# Patient Record
Sex: FEMALE | Race: BLACK OR AFRICAN AMERICAN | Employment: UNEMPLOYED | URBAN - METROPOLITAN AREA
[De-identification: names, ages, dates, MRNs, and addresses within clinical notes are randomized per-mention and may not be internally consistent; named-entity substitution may affect disease eponyms.]

---

## 2017-01-01 ENCOUNTER — HOSPITAL ENCOUNTER (OUTPATIENT)
Age: 47
Discharge: HOME | End: 2017-01-01
Attending: FAMILY MEDICINE

## 2017-01-01 VITALS
WEIGHT: 150 LBS | HEART RATE: 92 BPM | RESPIRATION RATE: 16 BRPM | TEMPERATURE: 98 F | BODY MASS INDEX: 23 KG/M2 | OXYGEN SATURATION: 100 % | DIASTOLIC BLOOD PRESSURE: 91 MMHG | SYSTOLIC BLOOD PRESSURE: 140 MMHG

## 2017-01-01 DIAGNOSIS — J40 BRONCHITIS: Primary | ICD-10-CM

## 2017-01-01 DIAGNOSIS — R06.2 WHEEZING: ICD-10-CM

## 2017-01-01 PROCEDURE — 94640 AIRWAY INHALATION TREATMENT: CPT

## 2017-01-01 PROCEDURE — 99203 OFFICE O/P NEW LOW 30 MIN: CPT

## 2017-01-01 PROCEDURE — 99204 OFFICE O/P NEW MOD 45 MIN: CPT

## 2017-01-01 RX ORDER — PREDNISONE 20 MG/1
40 TABLET ORAL DAILY
Qty: 10 TABLET | Refills: 0 | Status: SHIPPED | OUTPATIENT
Start: 2017-01-01 | End: 2017-01-06

## 2017-01-01 RX ORDER — AZITHROMYCIN 250 MG/1
TABLET, FILM COATED ORAL
Qty: 1 PACKAGE | Refills: 0 | Status: SHIPPED | OUTPATIENT
Start: 2017-01-01 | End: 2017-01-04

## 2017-01-01 RX ORDER — ALBUTEROL SULFATE 90 UG/1
2 AEROSOL, METERED RESPIRATORY (INHALATION) EVERY 4 HOURS PRN
Qty: 1 INHALER | Refills: 0 | Status: SHIPPED | OUTPATIENT
Start: 2017-01-01 | End: 2017-01-31

## 2017-01-01 RX ORDER — PREDNISONE 20 MG/1
60 TABLET ORAL ONCE
Status: COMPLETED | OUTPATIENT
Start: 2017-01-01 | End: 2017-01-01

## 2017-01-01 RX ORDER — IPRATROPIUM BROMIDE AND ALBUTEROL SULFATE 2.5; .5 MG/3ML; MG/3ML
3 SOLUTION RESPIRATORY (INHALATION) ONCE
Status: COMPLETED | OUTPATIENT
Start: 2017-01-01 | End: 2017-01-01

## 2017-01-01 NOTE — ED PROVIDER NOTES
Patient Seen in: [address]    History   Patient presents with:  Cough/URI    Stated Complaint: CONGESTION    Patient is a 55year old female presenting with cough. Cough  This is a new problem.  The current episode sta • Heart Disorder Maternal Grandmother    • Heart Disorder Maternal Grandfather    • Diabetes Paternal Grandmother    • Cancer Paternal Grandmother      CERVICAL   • Diabetes Paternal Grandfather    • Cancer Other      colon   • Breast Cancer Other      b time.   Skin: Skin is warm and dry. Psychiatric: She has a normal mood and affect. Her behavior is normal.   Nursing note and vitals reviewed.             ED Course   Labs Reviewed - No data to display      Disposition and Plan     Clinical Impression:  B

## 2017-01-01 NOTE — ED INITIAL ASSESSMENT (HPI)
Symptoms started Wed., cough and congestion, pt. Feels wheezing inetrmittently over past few days.   Post-tussive emesis (phlegm)

## 2018-07-16 PROCEDURE — 88175 CYTOPATH C/V AUTO FLUID REDO: CPT | Performed by: OBSTETRICS & GYNECOLOGY

## 2018-07-16 PROCEDURE — 87624 HPV HI-RISK TYP POOLED RSLT: CPT | Performed by: OBSTETRICS & GYNECOLOGY

## 2018-07-24 PROBLEM — M54.81 OCCIPITAL NEURALGIA: Status: ACTIVE | Noted: 2017-06-19

## 2018-07-24 PROBLEM — D50.9 ANEMIA, IRON DEFICIENCY: Status: ACTIVE | Noted: 2018-07-24

## 2019-04-04 PROCEDURE — 84165 PROTEIN E-PHORESIS SERUM: CPT | Performed by: OTHER

## 2019-04-04 PROCEDURE — 83883 ASSAY NEPHELOMETRY NOT SPEC: CPT | Performed by: OTHER

## 2019-04-04 PROCEDURE — 86334 IMMUNOFIX E-PHORESIS SERUM: CPT | Performed by: OTHER

## 2019-11-25 PROCEDURE — 88305 TISSUE EXAM BY PATHOLOGIST: CPT | Performed by: OBSTETRICS & GYNECOLOGY

## 2021-08-31 PROBLEM — K76.0 NAFLD (NONALCOHOLIC FATTY LIVER DISEASE): Status: ACTIVE | Noted: 2021-08-31

## 2021-08-31 PROBLEM — Z80.0 FAMILY HISTORY OF COLON CANCER: Status: ACTIVE | Noted: 2021-05-12

## 2021-11-07 ENCOUNTER — OFFICE VISIT (OUTPATIENT)
Dept: FAMILY MEDICINE CLINIC | Facility: CLINIC | Age: 51
End: 2021-11-07
Payer: COMMERCIAL

## 2021-11-07 VITALS
TEMPERATURE: 97 F | HEART RATE: 88 BPM | WEIGHT: 140 LBS | DIASTOLIC BLOOD PRESSURE: 104 MMHG | OXYGEN SATURATION: 98 % | RESPIRATION RATE: 18 BRPM | BODY MASS INDEX: 21.72 KG/M2 | HEIGHT: 67.5 IN | SYSTOLIC BLOOD PRESSURE: 192 MMHG

## 2021-11-07 DIAGNOSIS — R03.0 ELEVATED BLOOD PRESSURE READING: ICD-10-CM

## 2021-11-07 DIAGNOSIS — R30.0 DYSURIA: Primary | ICD-10-CM

## 2021-11-07 PROCEDURE — 99213 OFFICE O/P EST LOW 20 MIN: CPT | Performed by: NURSE PRACTITIONER

## 2021-11-07 PROCEDURE — 3077F SYST BP >= 140 MM HG: CPT | Performed by: NURSE PRACTITIONER

## 2021-11-07 PROCEDURE — 3080F DIAST BP >= 90 MM HG: CPT | Performed by: NURSE PRACTITIONER

## 2021-11-07 PROCEDURE — 87086 URINE CULTURE/COLONY COUNT: CPT | Performed by: NURSE PRACTITIONER

## 2021-11-07 PROCEDURE — 3008F BODY MASS INDEX DOCD: CPT | Performed by: NURSE PRACTITIONER

## 2021-11-07 PROCEDURE — 81003 URINALYSIS AUTO W/O SCOPE: CPT | Performed by: NURSE PRACTITIONER

## 2021-11-07 RX ORDER — NITROFURANTOIN 25; 75 MG/1; MG/1
100 CAPSULE ORAL 2 TIMES DAILY
Qty: 14 CAPSULE | Refills: 0 | Status: SHIPPED | OUTPATIENT
Start: 2021-11-07 | End: 2021-11-14

## 2021-11-07 NOTE — PROGRESS NOTES
CHIEF COMPLAINT:   Patient presents with:  UTI: Would like to be tested for UTI. - Entered by patient      HPI:   Kylee Johns is a 46year old female who presents with symptoms of UTI. Complaining of urinary dysuria for the past month.  Symptoms hav developed, well nourished, and in no apparent distress  CARDIO: RRR, no murmurs  LUNGS: clear to ausculation bilaterally, no wheezing or rhonchi  GI: BS present x 4 and normoactive. No hepatosplenomegaly.   : no suprapubic tenderness, No bladder distenti return in 3 days if not better. Call if fever, vomiting, worsening symptoms.

## 2022-02-10 ENCOUNTER — OFFICE VISIT (OUTPATIENT)
Dept: GASTROENTEROLOGY | Facility: CLINIC | Age: 52
End: 2022-02-10
Payer: COMMERCIAL

## 2022-02-10 ENCOUNTER — TELEPHONE (OUTPATIENT)
Dept: GASTROENTEROLOGY | Facility: CLINIC | Age: 52
End: 2022-02-10

## 2022-02-10 VITALS
HEART RATE: 103 BPM | BODY MASS INDEX: 22.8 KG/M2 | DIASTOLIC BLOOD PRESSURE: 109 MMHG | SYSTOLIC BLOOD PRESSURE: 168 MMHG | HEIGHT: 67.5 IN | WEIGHT: 147 LBS

## 2022-02-10 DIAGNOSIS — K76.0 NAFLD (NONALCOHOLIC FATTY LIVER DISEASE): ICD-10-CM

## 2022-02-10 DIAGNOSIS — R10.11 RUQ DISCOMFORT: Primary | ICD-10-CM

## 2022-02-10 DIAGNOSIS — Z12.12 SCREENING FOR COLORECTAL CANCER: ICD-10-CM

## 2022-02-10 DIAGNOSIS — Z12.11 SCREENING FOR COLORECTAL CANCER: ICD-10-CM

## 2022-02-10 DIAGNOSIS — R93.3 ABNORMAL CT SCAN, STOMACH: ICD-10-CM

## 2022-02-10 PROCEDURE — 3080F DIAST BP >= 90 MM HG: CPT | Performed by: INTERNAL MEDICINE

## 2022-02-10 PROCEDURE — 3077F SYST BP >= 140 MM HG: CPT | Performed by: INTERNAL MEDICINE

## 2022-02-10 PROCEDURE — 99204 OFFICE O/P NEW MOD 45 MIN: CPT | Performed by: INTERNAL MEDICINE

## 2022-02-10 PROCEDURE — 3008F BODY MASS INDEX DOCD: CPT | Performed by: INTERNAL MEDICINE

## 2022-02-10 NOTE — TELEPHONE ENCOUNTER
Scheduled for:  Colonoscopy 44966 EGD [id number]  Provider Name:  Dr Santana Riojas   Date:  Tuesday, 07/26/2022  Location:  [facility]  Sedation:  MAC  Time:  10:00am ( pt is aware arrival time is at 9:00am)  Prep:  Miralax/Gatorade   Meds/Allergies Reconciled?:  Physician Reviewed  Diagnosis with codes:  Abnormal CT Scan R93.89  Was patient informed to call insurance with codes (Y/N):  Yes  Referral sent?:  Referral was sent at the time of electronic surgical scheduling. [address] [facility] or [facility] [address] [place] [address] notified?:  I sent an electronic request to Endo Scheduling and received a confirmation today. Medication Orders:  Pt is aware to NOT take iron pills, herbal meds and diet supplements for 7 days before exam. Also to NOT take any form of alcohol, recreational drugs and any forms of ED meds 24 hours before exam.   Misc Orders:  Patient was informed that they will need a COVID 19 test prior to their procedure. Patient verbally understood & will await a phone call from [facility] to schedule. Further instructions given by staff:  I provide prep instructions to patient at the time of the appointment and reviewed date, time and location, she verbalized that she understood and is aware to call if she has any questions. Implemented All Universal Safety Interventions:  [place] to call system. Call bell, personal items and telephone within reach. Instruct patient to call for assistance. Room bathroom lighting operational. Non-slip footwear when patient is off stretcher. Physically safe environment: no spills, clutter or unnecessary equipment. Stretcher in lowest position, wheels locked, appropriate side rails in place.

## 2022-02-10 NOTE — PATIENT INSTRUCTIONS
1. Maintain weight in a target range. 2. Avoid alcohol. 3. Schedule screening colonoscopy and an upper endoscopy (abnormal CT of the stomach) following a MiraLax/Gatorade preparation.

## 2022-06-13 ENCOUNTER — IMMUNIZATION (OUTPATIENT)
Dept: LAB | Age: 52
End: 2022-06-13
Attending: EMERGENCY MEDICINE
Payer: COMMERCIAL

## 2022-06-13 DIAGNOSIS — Z23 NEED FOR VACCINATION: Primary | ICD-10-CM

## 2022-06-13 PROCEDURE — 0054A SARSCOV2 VAC 30MCG TRS SUCR: CPT

## 2022-07-19 ENCOUNTER — TELEPHONE (OUTPATIENT)
Dept: GASTROENTEROLOGY | Facility: CLINIC | Age: 52
End: 2022-07-19

## 2022-10-13 ENCOUNTER — IMMUNIZATION (OUTPATIENT)
Dept: LAB | Age: 52
End: 2022-10-13
Attending: EMERGENCY MEDICINE
Payer: COMMERCIAL

## 2022-10-13 DIAGNOSIS — Z23 NEED FOR VACCINATION: Primary | ICD-10-CM

## 2022-10-13 PROCEDURE — 0124A SARSCOV2 VAC BVL 30MCG/0.3ML: CPT

## 2022-11-06 ENCOUNTER — IMMUNIZATION (OUTPATIENT)
Dept: LAB | Age: 52
End: 2022-11-06
Attending: EMERGENCY MEDICINE
Payer: COMMERCIAL

## 2022-11-06 DIAGNOSIS — Z23 NEED FOR VACCINATION: Primary | ICD-10-CM

## 2022-11-06 PROCEDURE — 90686 IIV4 VACC NO PRSV 0.5 ML IM: CPT

## 2022-11-06 PROCEDURE — 90471 IMMUNIZATION ADMIN: CPT

## 2023-05-27 ENCOUNTER — OFFICE VISIT (OUTPATIENT)
Dept: FAMILY MEDICINE CLINIC | Facility: CLINIC | Age: 53
End: 2023-05-27
Payer: COMMERCIAL

## 2023-05-27 VITALS
RESPIRATION RATE: 18 BRPM | HEART RATE: 96 BPM | SYSTOLIC BLOOD PRESSURE: 130 MMHG | WEIGHT: 160 LBS | DIASTOLIC BLOOD PRESSURE: 82 MMHG | HEIGHT: 67.5 IN | OXYGEN SATURATION: 98 % | BODY MASS INDEX: 24.82 KG/M2 | TEMPERATURE: 98 F

## 2023-05-27 DIAGNOSIS — R09.81 NASAL CONGESTION: Primary | ICD-10-CM

## 2023-05-27 LAB
OPERATOR ID: NORMAL
RAPID SARS-COV-2 BY PCR: NOT DETECTED

## 2023-07-10 ENCOUNTER — HOSPITAL ENCOUNTER (EMERGENCY)
Facility: HOSPITAL | Age: 53
Discharge: HOME | End: 2023-07-10
Attending: EMERGENCY MEDICINE
Payer: COMMERCIAL

## 2023-07-10 VITALS
OXYGEN SATURATION: 97 % | TEMPERATURE: 98 F | BODY MASS INDEX: 25.11 KG/M2 | RESPIRATION RATE: 18 BRPM | HEIGHT: 67 IN | DIASTOLIC BLOOD PRESSURE: 95 MMHG | WEIGHT: 160 LBS | SYSTOLIC BLOOD PRESSURE: 129 MMHG | HEART RATE: 76 BPM

## 2023-07-10 DIAGNOSIS — R00.2 PALPITATIONS: Primary | ICD-10-CM

## 2023-07-10 LAB
ANION GAP SERPL CALC-SCNC: 8 MMOL/L (ref 0–18)
BASOPHILS # BLD AUTO: 0.02 X10(3) UL (ref 0–0.2)
BASOPHILS NFR BLD AUTO: 0.4 %
BUN BLD-MCNC: 15 MG/DL (ref 7–18)
BUN/CREAT SERPL: 14.6 (ref 10–20)
CALCIUM BLD-MCNC: 9.8 MG/DL (ref 8.5–10.1)
CHLORIDE SERPL-SCNC: 109 MMOL/L (ref 98–112)
CO2 SERPL-SCNC: 28 MMOL/L (ref 21–32)
CREAT BLD-MCNC: 1.03 MG/DL
DEPRECATED RDW RBC AUTO: 42.7 FL (ref 35.1–46.3)
EOSINOPHIL # BLD AUTO: 0.18 X10(3) UL (ref 0–0.7)
EOSINOPHIL NFR BLD AUTO: 3.4 %
ERYTHROCYTE [DISTWIDTH] IN BLOOD BY AUTOMATED COUNT: 13.4 % (ref 11–15)
GFR SERPLBLD BASED ON 1.73 SQ M-ARVRAT: 65 ML/MIN/1.73M2 (ref 60–?)
GLUCOSE BLD-MCNC: 103 MG/DL (ref 70–99)
HCT VFR BLD AUTO: 42.3 %
HGB BLD-MCNC: 14.2 G/DL
IMM GRANULOCYTES # BLD AUTO: 0.02 X10(3) UL (ref 0–1)
IMM GRANULOCYTES NFR BLD: 0.4 %
LYMPHOCYTES # BLD AUTO: 2.56 X10(3) UL (ref 1–4)
LYMPHOCYTES NFR BLD AUTO: 47.8 %
MCH RBC QN AUTO: 29.1 PG (ref 26–34)
MCHC RBC AUTO-ENTMCNC: 33.6 G/DL (ref 31–37)
MCV RBC AUTO: 86.7 FL
MONOCYTES # BLD AUTO: 0.41 X10(3) UL (ref 0.1–1)
MONOCYTES NFR BLD AUTO: 7.6 %
NEUTROPHILS # BLD AUTO: 2.17 X10 (3) UL (ref 1.5–7.7)
NEUTROPHILS # BLD AUTO: 2.17 X10(3) UL (ref 1.5–7.7)
NEUTROPHILS NFR BLD AUTO: 40.4 %
OSMOLALITY SERPL CALC.SUM OF ELEC: 301 MOSM/KG (ref 275–295)
PLATELET # BLD AUTO: 217 10(3)UL (ref 150–450)
POTASSIUM SERPL-SCNC: 4 MMOL/L (ref 3.5–5.1)
RBC # BLD AUTO: 4.88 X10(6)UL
SODIUM SERPL-SCNC: 145 MMOL/L (ref 136–145)
TROPONIN I HIGH SENSITIVITY: 18 NG/L
WBC # BLD AUTO: 5.4 X10(3) UL (ref 4–11)

## 2023-07-10 PROCEDURE — 93010 ELECTROCARDIOGRAM REPORT: CPT

## 2023-07-10 PROCEDURE — 80048 BASIC METABOLIC PNL TOTAL CA: CPT | Performed by: EMERGENCY MEDICINE

## 2023-07-10 PROCEDURE — 80048 BASIC METABOLIC PNL TOTAL CA: CPT

## 2023-07-10 PROCEDURE — 85025 COMPLETE CBC W/AUTO DIFF WBC: CPT

## 2023-07-10 PROCEDURE — 99284 EMERGENCY DEPT VISIT MOD MDM: CPT

## 2023-07-10 PROCEDURE — 93005 ELECTROCARDIOGRAM TRACING: CPT

## 2023-07-10 PROCEDURE — 84484 ASSAY OF TROPONIN QUANT: CPT | Performed by: EMERGENCY MEDICINE

## 2023-07-10 PROCEDURE — 84484 ASSAY OF TROPONIN QUANT: CPT

## 2023-07-10 PROCEDURE — 85025 COMPLETE CBC W/AUTO DIFF WBC: CPT | Performed by: EMERGENCY MEDICINE

## 2023-07-10 PROCEDURE — 36415 COLL VENOUS BLD VENIPUNCTURE: CPT

## 2023-07-11 LAB
ATRIAL RATE: 93 BPM
P AXIS: 70 DEGREES
P-R INTERVAL: 148 MS
Q-T INTERVAL: 356 MS
QRS DURATION: 74 MS
QTC CALCULATION (BEZET): 442 MS
R AXIS: 79 DEGREES
T AXIS: 43 DEGREES
VENTRICULAR RATE: 93 BPM

## 2023-07-11 NOTE — ED INITIAL ASSESSMENT (HPI)
Pt to ED with c/o rapid heart rate around 1600 today that have now subsided. Pt denies chest pain or sob. No respiratory distress noted. Pt is alert and oriented x4. Pt ambulating by self with steady gait. Pt skin parameters WNL. Pt denies hx of thyroid disorder or SVT.

## 2023-11-08 ENCOUNTER — IMMUNIZATION (OUTPATIENT)
Dept: LAB | Age: 53
End: 2023-11-08
Attending: EMERGENCY MEDICINE
Payer: COMMERCIAL

## 2023-11-08 DIAGNOSIS — Z23 NEED FOR VACCINATION: Primary | ICD-10-CM

## 2023-11-08 PROCEDURE — 90471 IMMUNIZATION ADMIN: CPT

## 2023-11-08 PROCEDURE — 90686 IIV4 VACC NO PRSV 0.5 ML IM: CPT

## 2024-01-02 ENCOUNTER — IMMUNIZATION (OUTPATIENT)
Dept: LAB | Age: 54
End: 2024-01-02
Attending: EMERGENCY MEDICINE
Payer: COMMERCIAL

## 2024-01-02 DIAGNOSIS — Z23 NEED FOR VACCINATION: Primary | ICD-10-CM

## 2024-01-02 PROCEDURE — 90480 ADMN SARSCOV2 VAC 1/ONLY CMP: CPT

## 2024-04-17 ENCOUNTER — HOSPITAL ENCOUNTER (EMERGENCY)
Facility: HOSPITAL | Age: 54
Discharge: LEFT BEFORE DISCHARGE | End: 2024-04-17
Payer: COMMERCIAL

## 2024-06-25 ENCOUNTER — OFFICE VISIT (OUTPATIENT)
Facility: LOCATION | Age: 54
End: 2024-06-25

## 2024-06-25 DIAGNOSIS — J31.0 NONALLERGIC RHINITIS: ICD-10-CM

## 2024-06-25 DIAGNOSIS — J34.2 DEVIATED NASAL SEPTUM: ICD-10-CM

## 2024-06-25 DIAGNOSIS — J34.89 NASAL VESTIBULITIS: ICD-10-CM

## 2024-06-25 DIAGNOSIS — J32.9 SINUSITIS, UNSPECIFIED CHRONICITY, UNSPECIFIED LOCATION: Primary | ICD-10-CM

## 2024-06-25 DIAGNOSIS — J34.3 HYPERTROPHY OF NASAL TURBINATES: ICD-10-CM

## 2024-06-25 PROCEDURE — 99203 OFFICE O/P NEW LOW 30 MIN: CPT | Performed by: STUDENT IN AN ORGANIZED HEALTH CARE EDUCATION/TRAINING PROGRAM

## 2024-06-26 NOTE — PROGRESS NOTES
[place]  OTOLARYNGOLOGY - HEAD & NECK SURGERY    2024     Reason for Consultation:   Nasal crusting    History of Present Illness:   Patient is a pleasant 54 year old female who is being seen for approximately 1 month of nasal crusting, dryness, and small amounts of blood coming from the nose.  The patient does admit that in the past she has seen an ENT and was recommended to have septoplasty and turbinate reduction.  She never underwent any sort of nasal surgery.  She has not used any intranasal medicated sprays.  She has used nasal saline spray in the past.  She states that this problem has only been occurring over the last month and not longer.  She does frequently pick out large scabs and crusts from her nose.  She also is a recently started having a altered smell and states that she has a vinegar like smell coming from the nose itself.  She does not report any brisk epistaxis.    Past Medical History  Past Medical History:    Acne    ANEMIA    Anxiety state, unspecified    Cough    EGD normal    Depression    Fibrocystic breast    Left breast    HEADACHES    HEMORRHOIDS    Liver hemangioma    NAFLD (nonalcoholic fatty liver disease)    Vaginismus       Past Surgical History  Past Surgical History:   Procedure Laterality Date          Hemorrhoidectomy      Other surgical history  12    colonoscopy, normal at [facility], repeat in 2017    Other surgical history  2019    cyst removed from mandible    Special service or report      HEMORRHOIDECTOMY    Upper gi endoscopy,diagnosis  10/23/09    Performed by HANNA IGLESIAS at [facility], [facility]       Family History  Family History   Problem Relation Age of Onset    Hypertension Father     Heart Disorder Father         enlarged heart, valvular disorder, ? CAD    Psychiatric Mother         DEPRESSION    Hypertension Mother     Other (CKD) Mother     Heart Disorder Maternal Grandmother     Heart Disorder Maternal Grandfather     Diabetes Paternal  Grandmother     Cancer Paternal Grandmother         CERVICAL    Diabetes Paternal Grandfather     Cancer Other         colon    Breast Cancer Other         breast    Colon Cancer Other        Social History  Pediatric History   Patient Parents    Not on file     Other Topics Concern    Not on file   Social History Narrative     SINCE 2006           Current Medications:  Current Outpatient Medications   Medication Sig Dispense Refill    mupirocin 2 % External Ointment Apply 1 Application topically 3 (three) times daily. Use for 2 weeks then stop 1 each 0       Allergies  No Known Allergies    Review of Systems:   A comprehensive 10 point review of systems was completed.  Pertinent positives and negatives noted in the the HPI.    Physical Exam:   not currently breastfeeding.    GENERAL: No acute distress, Comfortable appearing  FACE: HB 1/6, Normal Animation  HEAD: Normocephalic  EYES: EOMI, pupils equil  EARS: Bilateral Auricles Symmetric, bilateral tympanic membranes normal  NOSE: Nares patent bilaterally, and anterior nasal endoscopy was performed revealing bilateral thick secretions and crusting in the nasal vestibule.  The bilateral inferior turbinates were enlarged, the septum was mildly deviated to the left.  ORAL CAVITY: Tongue mobile, Oropharynx clear, Floor of mouth clear, Posterior oropharynx normal  NECK: No palpable lymphadenopathy, thyroid not palpable, nontender    Results:     Laboratory Data:  Lab Results   Component Value Date    WBC 5.4 07/10/2023    HGB 14.2 07/10/2023    HCT 42.3 07/10/2023    .0 07/10/2023    CREATSERUM 1.03 (H) 07/10/2023    BUN 15 07/10/2023     07/10/2023    K 4.0 07/10/2023     07/10/2023    CO2 28.0 07/10/2023     (H) 07/10/2023    CA 9.8 07/10/2023    ALB 4.5 06/03/2021    ALKPHO 52 06/03/2021    TP 7.2 06/03/2021    AST 17 06/03/2021    ALT 10 06/03/2021    TSH 1.820 06/03/2021    ESRML 18 04/04/2019    RPR Non Reactive 06/03/2013          Imaging:  No results found.      Impression:   Nasal vestibulitis  Nonallergic rhinitis  Bilateral inferior turbinate hypertrophy  Deviated nasal septum    Recommendations:  I will treat the patient for nasal vestibulitis with 2 weeks of mupirocin ointment for her to use 3 times a day  I have also sent nasal cultures and will message the patient with the results  She will return to see me if she has any persistent issues    Thank you for allowing me to participate in the care of your patient.    Yonatan Ba,    Otolaryngology/Rhinology, Sinus, and Endoscopic Skull Base Surgery  [address] [address]  Phone [phone]  Fax [phone]  6/25/2024  9:03 PM  6/25/2024

## 2024-06-27 ENCOUNTER — TELEPHONE (OUTPATIENT)
Dept: OTOLARYNGOLOGY | Facility: CLINIC | Age: 54
End: 2024-06-27

## 2024-06-27 NOTE — TELEPHONE ENCOUNTER
She can go down to twice a day if she is unable to tolerate it but it theres a chance it may not fully eradicate the bacteria. Her culture did show Staph aureus so I want to make sure its treated effectively.

## 2024-06-27 NOTE — TELEPHONE ENCOUNTER
Dr. Ba, Jia has started the Mupirocin ointment and she said it's been slightly burning her nose, she has a mild sore throat and her stomach felt a little off. I told her that the warmth or burning and mild sore throat are on the side effect list.  She wants to know if it's ok to take it twice a day instead of three times a day?    0 = understands/communicates without difficulty

## 2024-06-27 NOTE — TELEPHONE ENCOUNTER
Patient states she has questions about the side affects of the mupirocin 2 % External Ointment she is using. She specifically wants to know what are the abnormal side effects. She stated after using it she felt a warmth in her nose and felt warm air when she breathed. Also her stomach and throat felt strange. She is also asking if she can apply the ointment twice a day instead of three times a day. Please call.

## 2024-11-04 ENCOUNTER — IMMUNIZATION (OUTPATIENT)
Dept: LAB | Age: 54
End: 2024-11-04
Attending: EMERGENCY MEDICINE
Payer: COMMERCIAL

## 2024-11-04 DIAGNOSIS — Z23 NEED FOR VACCINATION: Primary | ICD-10-CM

## 2024-11-04 PROCEDURE — 90656 IIV3 VACC NO PRSV 0.5 ML IM: CPT

## 2024-11-04 PROCEDURE — 90471 IMMUNIZATION ADMIN: CPT

## 2025-01-08 ENCOUNTER — IMMUNIZATION (OUTPATIENT)
Dept: LAB | Age: 55
End: 2025-01-08
Attending: EMERGENCY MEDICINE
Payer: COMMERCIAL

## 2025-01-08 DIAGNOSIS — Z23 NEED FOR VACCINATION: Primary | ICD-10-CM

## 2025-01-08 PROCEDURE — 90480 ADMN SARSCOV2 VAC 1/ONLY CMP: CPT

## (undated) DIAGNOSIS — R93.89 ABNORMAL CT SCAN: Primary | ICD-10-CM

## (undated) NOTE — ED AVS SNAPSHOT
[facility] in [place] [address]    Phone:  [phone]    Fax:  [phone] [facility] 10   MRN: [id number]    Department:  [facility] AND [facility] in [facility]   Date of Visit: - azithromycin 250 MG Tabs  - predniSONE 20 MG Tabs            Discharge References/Attachments     BRONCHITIS WITH WHEEZING (ADULT) (ENGLISH)      Disclosure     Insurance plans vary and the physician(s) referred by the Immediate Care may not be covered b Registration line at [phone] or find a doctor online by visiting [email or web address].    IF THERE IS ANY CHANGE OR WORSENING OF YOUR CONDITION, CALL YOUR PRIMARY CARE PHYSICIAN AT ONCE OR GO TO [address] [place] [address].     If you have been prescribed a - If you have concerns related to behavioral health issues or thoughts of harming yourself, contact [address] [place] [address] at [phone].     - If you don’t have insurance, [facility]ens has partnered with [email or web address]